# Patient Record
Sex: FEMALE | Race: BLACK OR AFRICAN AMERICAN
[De-identification: names, ages, dates, MRNs, and addresses within clinical notes are randomized per-mention and may not be internally consistent; named-entity substitution may affect disease eponyms.]

---

## 2021-01-13 ENCOUNTER — HOSPITAL ENCOUNTER (OUTPATIENT)
Dept: HOSPITAL 76 - DI | Age: 31
Discharge: HOME | End: 2021-01-13
Attending: NURSE PRACTITIONER
Payer: COMMERCIAL

## 2021-01-13 DIAGNOSIS — N83.202: ICD-10-CM

## 2021-01-13 DIAGNOSIS — N94.10: Primary | ICD-10-CM

## 2021-01-14 NOTE — ULTRASOUND REPORT
PROCEDURE:  Pelvic w/Transvaginal

 

INDICATIONS:  DYSPAREUNIA

 

TECHNIQUE:  

Real-time scanning was performed of the pelvic organs, with image documentation.  Additional endovagi
nal scanning was necessary due to incomplete visualization of the adnexal and endometrial structures 
by transabdominal scanning.  

 

COMPARISON:  None.

 

FINDINGS:  

Transabdominal scanning:  Limited scanning through the kidneys shows no hydronephrosis.  No pathologi
c free abdominal or pelvic fluid.  

 

Endovaginal scanning:  

Uterus:  Uterus is normal in size at 3.0 x 4.3 x 7.0 cm anteverted.  The endometrium measures 4.5 mm 
in combined thickness.  

 

Ovaries:  The right ovary measures 2.3 x 1.3 x 2.7 cm and the left measures 3.1 x 1.7 x 2.0 cm. There
 is a dominant follicular cyst on the left measuring up to 1.9 x 1.6 x 1.6 cm.

 

IMPRESSION:  

Source of current symptoms is not found. Normal endometrial lining thickness. Largest ovarian cyst fo
und measures only 1.9 cm.

 

Reviewed by: Joey Alcantara MD on 1/14/2021 4:30 PM PST

Approved by: Joey Alcantara MD on 1/14/2021 4:30 PM PST

 

 

Station ID:  [id number]

## 2021-01-18 ENCOUNTER — HOSPITAL ENCOUNTER (EMERGENCY)
Dept: HOSPITAL 76 - ED | Age: 31
Discharge: HOME | End: 2021-01-18
Payer: COMMERCIAL

## 2021-01-18 VITALS — SYSTOLIC BLOOD PRESSURE: 122 MMHG | DIASTOLIC BLOOD PRESSURE: 79 MMHG
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
## 2021-01-18 DIAGNOSIS — S50.02XA: ICD-10-CM

## 2021-01-18 DIAGNOSIS — S53.402A: Primary | ICD-10-CM

## 2021-01-18 DIAGNOSIS — Z76.0: ICD-10-CM
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
## 2021-01-18 PROCEDURE — 99282 EMERGENCY DEPT VISIT SF MDM: CPT

## 2021-01-18 PROCEDURE — 99283 EMERGENCY DEPT VISIT LOW MDM: CPT

## 2021-01-18 NOTE — ED PHYSICIAN DOCUMENTATION
PD HPI UPPER EXT INJURY





- Stated complaint


Stated Complaint: LT ARM PX





- Chief complaint


Chief Complaint: Trauma Ext





- History obtained from


History obtained from: Patient





- History of Present Illness


Location: Left, Elbow


Type of injury: Fall


Where injury occurred: Other (snowboarding yesterday)


Timing - onset: How many days ago (1)


Timing - duration: Days (1)


Timing - details: Abrupt onset


Pain level now: 4


Improved by: Rest


Worsened by: Moving, Palpating


Associated symptoms: No: Weakness, Numbness, Tingling, Swelling


Recently seen: Not recently seen





- Additonal information


Additional information: 





Patient fell while snowboarding yesterday and injured her left elbow.  Worse 

with movement and better with rest[place]  [place] 

she had continued pain today to be reevaluated. Patient also states that she is 

out of her birth control pills and cannot get into see her doctor for another 

month.  She is requesting a refill.





Review of Systems


Constitutional: denies: Fever, Chills


GI: denies: Vomiting, Diarrhea


: denies: Now pregnant EGA


Skin: denies: Rash


Musculoskeletal: denies: Neck pain, Back pain


Neurologic: denies: Headache, Head injury





PD PAST MEDICAL HISTORY





- Past Medical History


Past Medical History: No





- Past Surgical History


Past Surgical History: No





- Present Medications


Home Medications: 


                                Ambulatory Orders











 Medication  Instructions  Recorded  Confirmed


 


l-Norgest/E.estradiol-E.estrad 1 each PO DAILY #1 tbdspk.3mo 01/18/21 





[Seasonique 0.15-0.03-0.01 Tab]   














- Allergies


Allergies/Adverse Reactions: 


                                    Allergies











Allergy/AdvReac Type Severity Reaction Status Date / Time


 


No Known Drug Allergies Allergy   Verified 01/18/21 11:14














- Social History


Does the pt smoke?: No


Smoking Status: Never smoker





- Immunizations


Immunizations are current?: Yes





PD ED PE NORMAL





- Vitals


Vital signs reviewed: Yes





- General


General: Alert and oriented X 3, No acute distress





- HEENT


HEENT: Moist mucous membranes





- Neck


Neck: Supple, no meningeal sign





- Cardiac


Cardiac: RRR





- Respiratory


Respiratory: No respiratory distress, Clear bilaterally





- Abdomen


Abdomen: Soft, Non tender, Non distended





- Derm


Derm: Warm and dry





- Extremities


Extremities: Other (L elbow - Mild tenderness to palpation over the distal 

humeral epicondyles.  Also tenderness over the radial head.  Pain with Pronation

and supination of the hand.  Neurovascularly intact.  Otherwise normal 

examination of the left upper extremity)





- Neuro


Neuro: Alert and oriented X 3





- Psych


Psych: Normal mood, Normal affect





Results





- Vitals


Vitals: 


                               Vital Signs - 24 hr











  01/18/21 01/18/21





  11:10 12:16


 


Temperature 36.7 C 


 


Heart Rate 71 60


 


Respiratory 18 18





Rate  


 


Blood Pressure 130/85 H 122/79


 


O2 Saturation 100 100








                                     Oxygen











O2 Source                      Room air

















- Rads (name of study)


  ** Left elbow xray


Radiology: Prelim report reviewed, EMP read contemporaneously, See rad report





PD MEDICAL DECISION MAKING





- ED course


Complexity details: reviewed results, considered differential, d/w patient


ED course: 





30-year-old female with what appears to be a left elbow sprain.  Moving the arm 

well here.  Declines a sling.  No acute findings on x-ray.  We will have her 

follow-up with her doctor for further care.  Patient counseled regarding signs 

and symptoms for which I believe and urgent re-evaluation would be necessary. 

Patient with good understanding of and agreement to plan and is comfortable 

going home at this time





This document was made in part using voice recognition software. While efforts 

are made to proofread this document, sound alike and grammatical errors may 

occur.





The sclerotic area of the proximal radial shaft was discussed with the patient 

as well.














No acute elbow fracture or dislocation. No significant joint effusion. Benign-

appearing sclerotic 


area involving proximal radial shaft likely represent benign process such as 

ossifying fibroma.





Departure





- Departure


Disposition: 01 Home, Self Care


Clinical Impression: 


 Medication refill





Left elbow contusion


Qualifiers:


 Encounter type: initial encounter Qualified Code(s): S50.02XA - Contusion of 

left elbow, initial encounter





Condition: Good


Instructions:  ED Sprain Elbow


Follow-Up: 


HEMALATHA TUCKER, MSN, FNP [Primary Care Provider] - Within 1 week


(if not better


)


Prescriptions: 


l-Norgest/E.estradiol-E.estrad [Seasonique 0.15-0.03-0.01 Tab] 1 each PO DAILY 

#1 tbdspk.3mo


Comments: 


Follow up with your doctor for further care.  Return if you worsen. Your xray 

does not show any acute abnormalities today. 


Discharge Date/Time: 01/18/21 12:16

## 2021-01-18 NOTE — XRAY REPORT
PROCEDURE:  Elbow 3 View LT

 

INDICATIONS:  fall, L elbow pain

 

TECHNIQUE:  3 views of the elbow were acquired.  

 

COMPARISON:  None

 

FINDINGS:  

Bones:  No fractures or dislocations.  No suspicious bony lesions.  Benign-appearing area of sclerosi
s involving proximal radial shaft without periosteal reaction or cortical destruction.

 

Soft tissues:  No elbow joint effusion.  No suspicious soft tissue calcifications.  

 

IMPRESSION:  

No acute elbow fracture or dislocation. No significant joint effusion. Benign-appearing sclerotic are
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

 

Reviewed by: Ronnie Stafford MD on 1/18/2021 11:39 AM PST

Approved by: Ronnie Stafford MD on 1/18/2021 11:39 AM [facility]

 

 

Station ID:  [id number]

## 2021-04-08 ENCOUNTER — HOSPITAL ENCOUNTER (OUTPATIENT)
Dept: HOSPITAL 76 - LAB.S | Age: 31
Discharge: HOME | End: 2021-04-08
Attending: NURSE PRACTITIONER
Payer: COMMERCIAL

## 2021-04-08 DIAGNOSIS — R31.9: ICD-10-CM

## 2021-04-08 DIAGNOSIS — R30.0: Primary | ICD-10-CM

## 2021-04-08 PROCEDURE — 87086 URINE CULTURE/COLONY COUNT: CPT

## 2021-11-30 ENCOUNTER — HOSPITAL ENCOUNTER (OUTPATIENT)
Dept: HOSPITAL 76 - DI | Age: 31
Discharge: HOME | End: 2021-11-30
Attending: ADVANCED PRACTICE MIDWIFE
Payer: COMMERCIAL

## 2021-11-30 DIAGNOSIS — Z36.89: ICD-10-CM

## 2021-11-30 DIAGNOSIS — Z34.01: Primary | ICD-10-CM

## 2021-11-30 PROCEDURE — 87340 HEPATITIS B SURFACE AG IA: CPT

## 2021-11-30 PROCEDURE — 86762 RUBELLA ANTIBODY: CPT

## 2021-11-30 PROCEDURE — 86901 BLOOD TYPING SEROLOGIC RH(D): CPT

## 2021-11-30 PROCEDURE — 86900 BLOOD TYPING SEROLOGIC ABO: CPT

## 2021-11-30 PROCEDURE — 85025 COMPLETE CBC W/AUTO DIFF WBC: CPT

## 2021-11-30 PROCEDURE — 86592 SYPHILIS TEST NON-TREP QUAL: CPT

## 2021-11-30 PROCEDURE — 87389 HIV-1 AG W/HIV-1&-2 AB AG IA: CPT

## 2021-11-30 PROCEDURE — 36415 COLL VENOUS BLD VENIPUNCTURE: CPT

## 2021-11-30 PROCEDURE — 86803 HEPATITIS C AB TEST: CPT

## 2021-11-30 PROCEDURE — 86787 VARICELLA-ZOSTER ANTIBODY: CPT

## 2021-11-30 PROCEDURE — 86850 RBC ANTIBODY SCREEN: CPT

## 2021-11-30 NOTE — ULTRASOUND REPORT
PROCEDURE:  OB First Trimester

 

INDICATIONS:  POSITIVE PREGNANCY TEST, DATING

 

OUTSIDE/PRIOR DATING DATA:  

Last menstrual period (LMP): 9/16/2021.  

LMP-based estimated date of delivery (ANTONIO): 6/23/2022.  

First dating scan (date and location): 11/30/2021.  

Estimated date of delivery (ANTONIO) from first dating scan: 6/17/2022.  

The below data below was generated using the ultrasound ANTONIO of 6/17/2022

 

TECHNIQUE:  

Real-time scanning was performed of the fetus and maternal pelvic organs, with image documentation.  


 

COMPARISON:  None.

 

FINDINGS:     

 

Embryo:  Single living intrauterine gestation with estimated sonographic gestational age of approxima
tely 11 weeks and 4 days based off crown-rump length measurement of 4.73 cm. Normal yolk sac seen. No
 perigestational hemorrhage.

Heart rate: 176 bpm

 

Measurement variability in dating:  +/- 4 weeks by LMP, +/- 7 days by mean sac diameter (use before 6
 weeks gestation if crown-rump length not able to be measured), +/- 5 days by crown-rump length (6-12
 weeks gestation).  

 

Maternal organs:  Ovaries appear normal. Maternal cervical length measures 5.1 cm..  

 

IMPRESSION:  

Single living intrauterine gestation with estimated sonographic gestational age of approximately 11 w
eeks and 4 days. Estimated date of delivery is approximately 6/17/2022.

 

Recommend routine follow-up second trimester fetal anatomic screening survey

 

Reviewed by: Jose Alberto Lundberg MD on 11/30/2021 9:39 AM PST

Approved by: Jose Alberto Lundberg MD on 11/30/2021 9:39 AM PST

 

 

Station ID:  [id number]

## 2021-12-01 ENCOUNTER — HOSPITAL ENCOUNTER (OUTPATIENT)
Dept: HOSPITAL 76 - LAB.WC | Age: 31
End: 2021-12-01
Attending: ADVANCED PRACTICE MIDWIFE
Payer: COMMERCIAL

## 2021-12-01 DIAGNOSIS — Z36.89: Primary | ICD-10-CM

## 2021-12-01 LAB
C TRACH DNA SPEC NAA+PROBE-ACNC: NEGATIVE
N GONORRHOEA DNA GENITAL QL NAA+PROBE: NEGATIVE
T VAGINALIS RRNA GENITAL QL PROBE: NEGATIVE

## 2021-12-01 PROCEDURE — 87086 URINE CULTURE/COLONY COUNT: CPT

## 2021-12-01 PROCEDURE — 87491 CHLMYD TRACH DNA AMP PROBE: CPT

## 2021-12-01 PROCEDURE — 87591 N.GONORRHOEAE DNA AMP PROB: CPT

## 2021-12-01 PROCEDURE — 87661 TRICHOMONAS VAGINALIS AMPLIF: CPT

## 2022-01-26 ENCOUNTER — HOSPITAL ENCOUNTER (OUTPATIENT)
Dept: HOSPITAL 76 - LAB | Age: 32
Discharge: HOME | End: 2022-01-26
Attending: ADVANCED PRACTICE MIDWIFE
Payer: COMMERCIAL

## 2022-01-26 DIAGNOSIS — Z36.8A: Primary | ICD-10-CM

## 2022-01-26 PROCEDURE — 81511 FTL CGEN ABNOR FOUR ANAL: CPT

## 2022-01-26 PROCEDURE — 36415 COLL VENOUS BLD VENIPUNCTURE: CPT

## 2022-01-27 LAB
AFP MOM: 1.58
AGE RISK DOWN SYNDROME: (no result)
CALC'D GESTATIONAL AGE: 18.9 WEEKS
CIGARETTE SMOKER?: (no result)
COMMENTS: (no result)
DONOR AGE: EGG RETRIEVAL: (no result)
DONOR EGG: NO
EDD DETERMINED BY: (no result)
ESTRIOL MOM: 1.18
HCG MOM: 1.11
HX OF NEURAL TUBE DEFECTS: NO
INHIBIN A MOM: 1.13
INSULIN DEPEND DIABETIC: NO
INTERPRETATION: (no result)
MATERNAL WEIGHT: 141 LBS
MSS DOWN SYNDROME RISK: (no result)
MSS3 TRISOMY 18 RISK: (no result)
NUMBER OF FETUSES: 1
PREV PREGNANCY DOWN SYND: NO
RISK FOR ONTD: (no result)

## 2022-02-03 ENCOUNTER — HOSPITAL ENCOUNTER (OUTPATIENT)
Dept: HOSPITAL 76 - DI | Age: 32
Discharge: HOME | End: 2022-02-03
Attending: ADVANCED PRACTICE MIDWIFE
Payer: COMMERCIAL

## 2022-02-03 DIAGNOSIS — Z34.92: Primary | ICD-10-CM

## 2022-02-03 DIAGNOSIS — Z36.89: ICD-10-CM

## 2022-02-03 DIAGNOSIS — Z3A.20: ICD-10-CM

## 2022-02-03 NOTE — ULTRASOUND REPORT
PROCEDURE:  OB Detailed Fetal Eval

 

INDICATIONS:  SUPERVISION NORMAL PREGNANCY

 

OUTSIDE/PRIOR DATING DATA:  

Last menstrual period (LMP): 9/16/2021.  

LMP-based estimated date of delivery (ANTONIO): 6/23/2022.  

First dating scan (date and location): 11/30/2021.  

Estimated date of delivery (ANTONIO) from first dating scan: 6/17/2022.  

The below data below was generated using the ultrasound ANTONIO of 6/17/2022

 

TECHNIQUE: 

Real-time scanning was performed of the fetus, with image documentation and biometric measurements.  


Endovaginal scanning:  Not performed.

 

COMPARISON:  11/30/2021.

 

FINDINGS:  

 

General:  A single living intrauterine gestation is present.  

Presentation:  Variable

Placenta:  Placental position is anterior, without previa.  

Amniotic fluid index:  13.7 cm,  normal for gestational age. Largest pocket 4.4 cm. 

Fetal heart rate:  162 beats per minute.  

Maternal cervical canal:  4.2 cm long; normal length is 2.5 cm or more.  

 

Fetal biometrics:  

Biparietal diameter:  4.9 cm, 20 weeks 5 days

Head circumference:  18.0 cm, 20 weeks 3 days

Abdominal circumference:  15.3 cm, 20 weeks 4 days

Femur length:  3.3 cm, 20 weeks 1 day

 

Estimated gestational age from initial scan: 20 weeks 6 days.  

Composite gestational age from present scan:  20 weeks 3 days

Estimated fetal weight and percentile:  351 g, 22nd percentile

 

Measurement variability in biometric dating: +/- 10 days from 12-20 weeks gestation, +/- 2 weeks from
 20-30 weeks gestation, +/- 3 weeks at 30 weeks gestation or later.  

 

Anatomic survey:  

Neuro:  Ventricles are normal at less than 10 mm.  Cisterna magna is normal at 3-11 mm.  Cerebellum i
s normal in size and morphology.  

Nuchal skin fold:  Normal at less than 6 mm between 14 and 20 weeks gestational age.  

Face:  Nose and lips, facial profile are normal.  

Spine:  No evidence for spina bifida.  

Heart:  4-chambered heart is present, with normal ventricular outflow tracts.  

Diaphragm:  Diaphragm is intact.  

Stomach:  Left-sided stomach is present.  

Kidneys:  No fetal hydronephrosis.  Normal is less than 5 mm in 2nd trimester, less than 7 mm in 3rd 
trimester.  

Cord:  3 vessel cord has orthotopic insertion.  

Bladder:  Normal in size.  

Extremities:  All 4 extremities are visualized.  

 

IMPRESSION:  

1. Glover living intrauterine pregnancy at 20 weeks 3 days based on today's ultrasound. This is co
ncordant with the first trimester ultrasound. There is expected interval growth.

 

2. Normal placenta and amniotic fluid.

 

3. Normal and complete fetal anatomic survey.

 

Reviewed by: Moiz Crawley MD on 2/3/2022 4:21 PM PST

Approved by: Moiz Crawley MD on 2/3/2022 4:21 PM PST

 

 

Station ID:  [id number]

## 2022-02-08 ENCOUNTER — HOSPITAL ENCOUNTER (OUTPATIENT)
Dept: HOSPITAL 76 - LAB | Age: 32
End: 2022-02-08
Attending: ADVANCED PRACTICE MIDWIFE
Payer: COMMERCIAL

## 2022-02-08 DIAGNOSIS — Z87.440: ICD-10-CM

## 2022-02-08 DIAGNOSIS — Z34.00: Primary | ICD-10-CM

## 2022-02-08 PROCEDURE — 87086 URINE CULTURE/COLONY COUNT: CPT

## 2022-03-01 ENCOUNTER — HOSPITAL ENCOUNTER (OUTPATIENT)
Dept: HOSPITAL 76 - LAB.WC | Age: 32
Discharge: HOME | End: 2022-03-01
Attending: ADVANCED PRACTICE MIDWIFE
Payer: COMMERCIAL

## 2022-03-01 DIAGNOSIS — Z34.00: Primary | ICD-10-CM

## 2022-03-01 DIAGNOSIS — Z87.440: ICD-10-CM

## 2022-03-01 PROCEDURE — 87086 URINE CULTURE/COLONY COUNT: CPT

## 2022-03-23 ENCOUNTER — HOSPITAL ENCOUNTER (OUTPATIENT)
Dept: HOSPITAL 76 - LAB | Age: 32
Discharge: HOME | End: 2022-03-23
Attending: ADVANCED PRACTICE MIDWIFE
Payer: COMMERCIAL

## 2022-03-23 DIAGNOSIS — Z36.89: Primary | ICD-10-CM

## 2022-03-23 LAB
BASOPHILS NFR BLD AUTO: 0 10^3/UL (ref 0–0.1)
BASOPHILS NFR BLD AUTO: 0.2 %
EOSINOPHIL # BLD AUTO: 0.1 10^3/UL (ref 0–0.7)
EOSINOPHIL NFR BLD AUTO: 0.6 %
ERYTHROCYTE [DISTWIDTH] IN BLOOD BY AUTOMATED COUNT: 11.9 % (ref 12–15)
HCT VFR BLD AUTO: 35.7 % (ref 37–47)
HGB UR QL STRIP: 11.6 G/DL (ref 12–16)
LYMPHOCYTES # SPEC AUTO: 1.1 10^3/UL (ref 1.5–3.5)
LYMPHOCYTES NFR BLD AUTO: 12.7 %
MCH RBC QN AUTO: 28 PG (ref 27–31)
MCHC RBC AUTO-ENTMCNC: 32.5 G/DL (ref 32–36)
MCV RBC AUTO: 86.2 FL (ref 81–99)
MONOCYTES # BLD AUTO: 0.4 10^3/UL (ref 0–1)
MONOCYTES NFR BLD AUTO: 5 %
NEUTROPHILS # BLD AUTO: 6.8 10^3/UL (ref 1.5–6.6)
NEUTROPHILS # SNV AUTO: 8.4 X10^3/UL (ref 4.8–10.8)
NEUTROPHILS NFR BLD AUTO: 81.1 %
NRBC # BLD AUTO: 0 /100WBC
NRBC # BLD AUTO: 0 X10^3/UL
PDW BLD AUTO: 11.8 FL (ref 7.9–10.8)
PLATELET # BLD: 170 10^3/UL (ref 130–450)
RBC MAR: 4.14 10^6/UL (ref 4.2–5.4)

## 2022-03-23 PROCEDURE — 85025 COMPLETE CBC W/AUTO DIFF WBC: CPT

## 2022-03-23 PROCEDURE — 82950 GLUCOSE TEST: CPT

## 2022-03-23 PROCEDURE — 36415 COLL VENOUS BLD VENIPUNCTURE: CPT

## 2022-06-22 ENCOUNTER — HOSPITAL ENCOUNTER (OUTPATIENT)
Dept: HOSPITAL 76 - LAB | Age: 32
Discharge: HOME | End: 2022-06-22
Attending: ADVANCED PRACTICE MIDWIFE
Payer: COMMERCIAL

## 2022-06-22 DIAGNOSIS — R30.0: Primary | ICD-10-CM

## 2022-06-22 LAB
CLARITY UR REFRACT.AUTO: CLEAR
GLUCOSE UR QL STRIP.AUTO: NEGATIVE MG/DL
KETONES UR QL STRIP.AUTO: NEGATIVE MG/DL
NITRITE UR QL STRIP.AUTO: NEGATIVE
PH UR STRIP.AUTO: 6.5 PH (ref 5–7.5)
PROT UR STRIP.AUTO-MCNC: NEGATIVE MG/DL
RBC # UR STRIP.AUTO: (no result) /UL
RBC # URNS HPF: (no result) /HPF (ref 0–5)
SP GR UR STRIP.AUTO: <=1.005 (ref 1–1.03)
SQUAMOUS URNS QL MICRO: (no result)
UROBILINOGEN UR QL STRIP.AUTO: (no result) E.U./DL
UROBILINOGEN UR STRIP.AUTO-MCNC: NEGATIVE MG/DL
WBC # UR MANUAL: (no result) /HPF (ref 0–5)

## 2022-06-22 PROCEDURE — 87086 URINE CULTURE/COLONY COUNT: CPT

## 2022-06-22 PROCEDURE — 81001 URINALYSIS AUTO W/SCOPE: CPT
